# Patient Record
Sex: MALE | Race: BLACK OR AFRICAN AMERICAN | Employment: FULL TIME | URBAN - METROPOLITAN AREA
[De-identification: names, ages, dates, MRNs, and addresses within clinical notes are randomized per-mention and may not be internally consistent; named-entity substitution may affect disease eponyms.]

---

## 2020-10-07 ENCOUNTER — HOSPITAL ENCOUNTER (EMERGENCY)
Age: 25
Discharge: HOME | End: 2020-10-07

## 2020-10-07 ENCOUNTER — APPOINTMENT (OUTPATIENT)
Dept: GENERAL RADIOLOGY | Age: 25
End: 2020-10-07
Attending: EMERGENCY MEDICINE

## 2020-10-07 VITALS
DIASTOLIC BLOOD PRESSURE: 69 MMHG | HEIGHT: 74 IN | RESPIRATION RATE: 18 BRPM | SYSTOLIC BLOOD PRESSURE: 105 MMHG | TEMPERATURE: 98.5 F | BODY MASS INDEX: 23.1 KG/M2 | WEIGHT: 180 LBS | HEART RATE: 72 BPM | OXYGEN SATURATION: 99 %

## 2020-10-07 DIAGNOSIS — S63.501A SPRAIN OF RIGHT WRIST, INITIAL ENCOUNTER: Primary | ICD-10-CM

## 2020-10-07 PROCEDURE — 99283 EMERGENCY DEPT VISIT LOW MDM: CPT

## 2020-10-07 PROCEDURE — 73110 X-RAY EXAM OF WRIST: CPT

## 2020-10-07 RX ORDER — NAPROXEN 500 MG/1
500 TABLET ORAL 2 TIMES DAILY WITH MEALS
Qty: 20 TAB | Refills: 0 | Status: SHIPPED | OUTPATIENT
Start: 2020-10-07 | End: 2020-10-17

## 2020-10-07 NOTE — ED TRIAGE NOTES
Right wrist pain, and numbness. SX. X 1 day. Patient states he cut right forearm on 09/23/2020 received sutures.

## 2020-10-07 NOTE — Clinical Note
[address] [place] [address] EMERGENCY DEPT 
[facility] [address] [address] 
[phone] Work/School Note Date: 10/7/2020 To Whom It May concern: 
 
Tray Winston was seen and treated today in the emergency room by the following provider(s): 
Physician Assistant: SYLWIA Newberry. Tray Winston is excused from work/school on 10/07/20 and 10/08/20. He is medically clear to return to work/school on 10/9/2020. Sincerely, SYLWIA Garcia

## 2020-10-07 NOTE — ED PROVIDER NOTES
EMERGENCY DEPARTMENT HISTORY AND PHYSICAL EXAM      Date: 10/7/2020  Patient Name: Sage Tirado    History of Presenting Illness     Chief Complaint   Patient presents with    Wrist Pain       History Provided By: Patient    HPI: Sage Tirado, 23 y.o. male L hand dominant with  No significant past medical history presents to the ED with cc of gradually worsening, constant, sharp right wrist pain and numbness over recent laceration site x1 day. Patient reports he lacerated his arm after he went through glass on 9/23/2020. [facility] but no x-ray. Had sutures removed recently and noticed today while at work at [employer] carrying heavy boxes that his right wrist hurt and his laceration site feels numb. He has not taken any medications for his symptoms. Smokes cigarettes daily. Denies any other medical history. Denies any new injury, numbness or tingling in his fingers, chest pain, shortness of breath, fever, chills. There are no other complaints, changes, or physical findings at this time. PCP: No primary care provider on file. No current facility-administered medications on file prior to encounter. No current outpatient medications on file prior to encounter. Past History     Past Medical History:  No past medical history on file. Past Surgical History:  No past surgical history on file. Family History:  No family history on file. Social History:  Social History     Tobacco Use    Smoking status: Not on file   Substance Use Topics    Alcohol use: Not on file    Drug use: Not on file       Allergies:  No Known Allergies      Review of Systems     Review of Systems   Constitutional: Negative for chills, fatigue and fever. HENT: Negative. Respiratory: Negative for cough, chest tightness, shortness of breath and wheezing. Cardiovascular: Negative for chest pain and palpitations.    Gastrointestinal: Negative for abdominal pain, diarrhea, nausea and vomiting. Genitourinary: Negative for frequency and urgency. Musculoskeletal: Positive for arthralgias (R wrist). Negative for back pain, neck pain and neck stiffness. Skin: Negative for rash. Neurological: Negative for dizziness, weakness, light-headedness and headaches. Psychiatric/Behavioral: Negative. All other systems reviewed and are negative. Physical Exam     Physical Exam  Vitals signs and nursing note reviewed. Constitutional:       General: He is not in acute distress. Appearance: Normal appearance. He is not ill-appearing or toxic-appearing. HENT:      Head: Normocephalic and atraumatic. Nose: Nose normal.      Mouth/Throat:      Mouth: Mucous membranes are moist.   Eyes:      General: Lids are normal.      Conjunctiva/sclera:      Right eye: Right conjunctiva is not injected. Left eye: Left conjunctiva is not injected. Neck:      Musculoskeletal: Normal range of motion and neck supple. Cardiovascular:      Rate and Rhythm: Normal rate and regular rhythm. Heart sounds: No murmur. No friction rub. No gallop. Pulmonary:      Effort: Pulmonary effort is normal. No tachypnea or respiratory distress. Breath sounds: Normal breath sounds. No stridor or decreased air movement. Musculoskeletal: Normal range of motion. General: No swelling, tenderness, deformity or signs of injury. Right lower leg: No edema. Left lower leg: No edema. Comments: Approximately 3 cm linear vertical laceration that is well-healed to volar aspect of the right wrist.  No active drainage. No erythema to suggest cellulitis. Mild tenderness/sensitivity to palpation over the laceration site. 2+ radial pulse bilaterally.  strength 5/5 bilaterally. Sensation intact distally. Skin:     General: Skin is warm. Capillary Refill: Capillary refill takes less than 2 seconds. Neurological:      General: No focal deficit present. Mental Status: He is alert and oriented to person, place, and time. Mental status is at baseline. Psychiatric:         Mood and Affect: Mood normal.         Behavior: Behavior is cooperative. Thought Content: Thought content normal.         Judgment: Judgment normal.         Diagnostic Study Results     Labs -   No results found for this or any previous visit (from the past 12 hour(s)). Radiologic Studies -   XR Results (most recent):  Results from Hospital Encounter encounter on 10/07/20   XR WRIST RT AP/LAT/OBL MIN 3V    Narrative Right wrist, 3 views, 10/7/2020    History: Injury. Comparison: None. Findings: The carpal bones demonstrate anatomical alignment. No acute fracture  is identified. The distal radius and ulna appear intact. Impression Impression: No acute fracture or dislocation. Medical Decision Making   I am the first provider for this patient. I reviewed the vital signs, available nursing notes, past medical history, past surgical history, family history and social history. Vital Signs-Reviewed the patient's vital signs. Patient Vitals for the past 12 hrs:   Temp Pulse Resp BP SpO2   10/07/20 1113 98.3 °F (36.8 °C) 72 18 114/69 98 %       Records Reviewed: Nursing Notes    The patient presents with L wrist pain with a differential diagnosis of sprain, contusion, cellulitis      Provider Notes (Medical Decision Making):     MDM  Number of Diagnoses or Management Options  Sprain of right wrist, initial encounter:   Diagnosis management comments:     26-year-old with nontraumatic right wrist pain. X-ray was unremarkable. Patient is neurovascularly intact. Will provide him with pain medication and wrist splint for symptomatic relief. Provided work note. Patient agreeable plan of care.        Amount and/or Complexity of Data Reviewed  Tests in the radiology section of CPT®: ordered and reviewed  Review and summarize past medical records: yes    Patient Progress  Patient progress: stable           ED Course:   Initial assessment performed. The patients presenting problems have been discussed, and they are in agreement with the care plan formulated and outlined with them. I have encouraged them to ask questions as they arise throughout their visit. PLAN:  1. Current Discharge Medication List      START taking these medications    Details   naproxen (Naprosyn) 500 mg tablet Take 1 Tab by mouth two (2) times daily (with meals) for 10 days. Qty: 20 Tab, Refills: 0           2. Follow-up Information     Follow up With Specialties Details Why [facility]    [facility] EMERGENCY DEPT Emergency Medicine  As needed, If symptoms worsen [address] Des Michael MD Family Medicine  As needed [address] [place] [id number]  [phone]          Return to ED if worse     Diagnosis     Clinical Impression:   1. Sprain of right wrist, initial encounter      DISCHARGE NOTE:  12:00 PM  Pt has been reexamined. Pt has no new complaints, changes, or physical findings. Care plan outlined and precautions discussed. All available results reviewed with pt. All medications reviewed with pt. All of pts questions and concerns addressed. Pt agrees to f/u as instructed and agrees to return to ED upon further deterioration. Pt is ready to go home. Please note that this dictation was completed with [employer], the computer voice recognition software. Quite often unanticipated grammatical, syntax, homophones, and other interpretive errors are inadvertently transcribed by the computer software. Please disregard these errors. Please excuse any errors that have escaped final proofreading. Thank you.   SYLWIA Lofton

## 2020-10-19 ENCOUNTER — HOSPITAL ENCOUNTER (EMERGENCY)
Age: 25
Discharge: HOME | End: 2020-10-19
Attending: EMERGENCY MEDICINE

## 2020-10-19 VITALS
TEMPERATURE: 97.9 F | WEIGHT: 173 LBS | DIASTOLIC BLOOD PRESSURE: 73 MMHG | RESPIRATION RATE: 18 BRPM | OXYGEN SATURATION: 100 % | BODY MASS INDEX: 22.2 KG/M2 | HEIGHT: 74 IN | SYSTOLIC BLOOD PRESSURE: 115 MMHG | HEART RATE: 55 BPM

## 2020-10-19 DIAGNOSIS — T78.40XA ALLERGIC REACTION, INITIAL ENCOUNTER: ICD-10-CM

## 2020-10-19 DIAGNOSIS — L23.9 ALLERGIC CONTACT DERMATITIS, UNSPECIFIED TRIGGER: Primary | ICD-10-CM

## 2020-10-19 PROCEDURE — 99281 EMR DPT VST MAYX REQ PHY/QHP: CPT

## 2020-10-19 RX ORDER — LORATADINE 10 MG/1
10 TABLET ORAL DAILY
Qty: 30 TAB | Refills: 0 | Status: SHIPPED | OUTPATIENT
Start: 2020-10-19

## 2020-10-19 RX ORDER — PREDNISONE 20 MG/1
60 TABLET ORAL DAILY
Qty: 15 TAB | Refills: 0 | Status: SHIPPED | OUTPATIENT
Start: 2020-10-19 | End: 2020-10-24

## 2020-10-19 RX ORDER — TRAMADOL HYDROCHLORIDE 50 MG/1
50 TABLET ORAL
Qty: 12 TAB | Refills: 0 | Status: SHIPPED | OUTPATIENT
Start: 2020-10-19 | End: 2020-10-22

## 2020-10-19 NOTE — ED TRIAGE NOTES
C/O hives to upper body x 3 days. Unknown cause. Recent PMH:     Pt had laceration in September and was sutured at [facility]. Came to this ED for pain management for lac. Was prescribed naproxen.

## 2020-10-19 NOTE — ED PROVIDER NOTES
EMERGENCY DEPARTMENT HISTORY AND PHYSICAL EXAM      Date: 10/19/2020  Patient Name: Betzaida Pichardo    History of Presenting Illness     Chief Complaint   Patient presents with    Rash       History Provided By: Patient    HPI: Betzaida Pichardo, 23 y.o. male with a past medical history significant No significant past medical history presents to the ED with chief complaint of Rash  . Patient was recently seen for a wrist injury had the sutures removed but did have some residual pain and was prescribed Naprosyn. This was about 3 weeks ago. Started the Naprosyn 1 week ago which tolerated the pain well. Has had the medicine before with no issues. He has been having a diffuse urticarial hive-like rash for the last few days. He sleeps in the same bed as his girlfriend who has no symptoms. He has not tried any topical creams. No antihistamines. It is all over sparing his face. No new detergents or soaps. No new foods. Also denies any cough shortness of breath or fevers. There are no other complaints, changes, or physical findings at this time. PCP: None    Current Outpatient Medications   Medication Sig Dispense Refill    predniSONE (DELTASONE) 20 mg tablet Take 60 mg by mouth daily for 5 days. With Breakfast 15 Tab 0    traMADoL (Ultram) 50 mg tablet Take 1 Tab by mouth every six (6) hours as needed for Pain for up to 3 days. Max Daily Amount: 200 mg. 12 Tab 0    loratadine (Claritin) 10 mg tablet Take 1 Tab by mouth daily. 30 Tab 0       Past History     Past Medical History:  No past medical history on file. Past Surgical History:  No past surgical history on file. Family History:  No family history on file. Social History:  Social History     Tobacco Use    Smoking status: Not on file   Substance Use Topics    Alcohol use: Not on file    Drug use: Not on file       Allergies:  No Known Allergies      Review of Systems   Review of Systems   Constitutional: Negative.   Negative for chills, fatigue and fever. HENT: Negative. Negative for congestion, ear pain, nosebleeds and sore throat. Eyes: Negative. Negative for pain, discharge and visual disturbance. Respiratory: Negative. Negative for cough, chest tightness and shortness of breath. Cardiovascular: Negative. Negative for chest pain and leg swelling. Gastrointestinal: Negative. Negative for abdominal pain, blood in stool, constipation, diarrhea, nausea and vomiting. Endocrine: Negative. Genitourinary: Negative. Negative for difficulty urinating, dysuria and flank pain. Musculoskeletal: Negative. Negative for back pain and myalgias. Skin: Positive for rash. Negative for wound. Allergic/Immunologic: Negative. Neurological: Negative. Negative for dizziness, syncope, weakness, numbness and headaches. Hematological: Negative. Does not bruise/bleed easily. Psychiatric/Behavioral: Negative. Negative for agitation, confusion, hallucinations and suicidal ideas. All other systems reviewed and are negative. Physical Exam   Physical Exam  Vitals signs and nursing note reviewed. Constitutional:       General: He is not in acute distress. Appearance: He is normal weight. He is not ill-appearing. HENT:      Head: Normocephalic and atraumatic. Right Ear: External ear normal.      Left Ear: External ear normal.      Nose: Nose normal. No rhinorrhea. Mouth/Throat:      Mouth: Mucous membranes are moist.      Pharynx: Oropharynx is clear. Eyes:      Extraocular Movements: Extraocular movements intact. Conjunctiva/sclera: Conjunctivae normal.      Pupils: Pupils are equal, round, and reactive to light. Neck:      Musculoskeletal: Normal range of motion and neck supple. Cardiovascular:      Rate and Rhythm: Normal rate and regular rhythm. Pulses: Normal pulses. Heart sounds: Normal heart sounds. Pulmonary:      Effort: Pulmonary effort is normal. No respiratory distress. Breath sounds: Normal breath sounds. Abdominal:      General: Abdomen is flat. Bowel sounds are normal.      Palpations: Abdomen is soft. Musculoskeletal: Normal range of motion. General: No tenderness or deformity. Skin:     General: Skin is warm and dry. Capillary Refill: Capillary refill takes less than 2 seconds. Findings: No bruising, lesion or rash. Comments: Diffuse urticarial hive-like rash. Neurological:      General: No focal deficit present. Mental Status: He is alert and oriented to person, place, and time. Mental status is at baseline. Psychiatric:         Mood and Affect: Mood normal.         Behavior: Behavior normal.         Thought Content: Thought content normal.         Judgment: Judgment normal.         Diagnostic Study Results     Labs -   No results found for this or any previous visit (from the past 12 hour(s)). Radiologic Studies -   No orders to display     CT Results  (Last 48 hours)    None        CXR Results  (Last 48 hours)    None          Medical Decision Making and ED Course   I am the first provider for this patient. I reviewed the vital signs, available nursing notes, past medical history, past surgical history, family history and social history. Vital Signs-Reviewed the patient's vital signs. Patient Vitals for the past 12 hrs:   Temp Pulse Resp BP SpO2   10/19/20 1042 97.9 °F (36.6 °C) (!) 55 18 115/73 100 %       EKG interpretation:         Records Reviewed: Previous Hospital chart. EMS run report      ED Course:   Initial assessment performed. The patients presenting problems have been discussed, and they are in agreement with the care plan formulated and outlined with them. I have encouraged them to ask questions as they arise throughout their visit. Orders Placed This Encounter    predniSONE (DELTASONE) 20 mg tablet     Sig: Take 60 mg by mouth daily for 5 days.  With Breakfast     Dispense:  15 Tab     Refill:  0    traMADoL (Ultram) 50 mg tablet     Sig: Take 1 Tab by mouth every six (6) hours as needed for Pain for up to 3 days. Max Daily Amount: 200 mg. Dispense:  12 Tab     Refill:  0    loratadine (Claritin) 10 mg tablet     Sig: Take 1 Tab by mouth daily. Dispense:  30 Tab     Refill:  0              CONSULTANTS:      Provider Notes (Medical Decision Making):   37-year-old male with stable vitals presents with urticarial hive-like rash diffuse. Concern for an allergic reaction. No evidence of petechiae, vesicles, bedbugs. Questionable which may have triggered it could be the Naprosyn he recently started for his wrist pain versus a food. Plan to switch to a different pain reliever. Treatment for allergic reaction with steroids and antihistamines at home. Vitals are stable for discharge. Procedures                       Disposition       Emergency Department Disposition:  Discharged      DISCHARGE PLAN:    Patient is discharged home. Discharge instructions provided. Patient is stable and improved at time of disposition. Vitals are stable. 1.   Current Discharge Medication List      START taking these medications    Details   predniSONE (DELTASONE) 20 mg tablet Take 60 mg by mouth daily for 5 days. With Breakfast  Qty: 15 Tab, Refills: 0      traMADoL (Ultram) 50 mg tablet Take 1 Tab by mouth every six (6) hours as needed for Pain for up to 3 days. Max Daily Amount: 200 mg. Qty: 12 Tab, Refills: 0    Associated Diagnoses: Allergic contact dermatitis, unspecified trigger; Allergic reaction, initial encounter      loratadine (Claritin) 10 mg tablet Take 1 Tab by mouth daily. Qty: 30 Tab, Refills: 0           2. Follow-up Information     Follow up With Specialties Details Why Contact Info    Guadlupe Hodgkin, MD Internal Medicine   [address] [place] [address]  [address] [place], [place] [address]y Dermatology   As needed [address] [facility],[address]  [phone]        3. Return to ED if worse     Pt voiced they understand they plan and do not have questions at this time      Diagnosis     Clinical Impression:   1. Allergic contact dermatitis, unspecified trigger    2. Allergic reaction, initial encounter        Attestations:    Nadine Casillas MD    Please note that this dictation was completed with [employer], the computer voice recognition software. Quite often unanticipated grammatical, syntax, homophones, and other interpretive errors are inadvertently transcribed by the computer software. Please disregard these errors. Please excuse any errors that have escaped final proofreading. Thank you.

## 2020-10-19 NOTE — DISCHARGE INSTRUCTIONS
Patient Education        Dermatitis: Care Instructions  Your Care Instructions  Dermatitis is the general name used for any rash or inflammation of the skin. Different kinds of dermatitis cause different kinds of rashes. Common causes of a rash include new medicines, plants (such as poison oak or poison ivy), heat, and stress. Certain illnesses can also cause a rash. An allergic reaction to something that touches your skin, such as latex, nickel, or poison ivy, is called contact dermatitis. Contact dermatitis may also be caused by something that irritates the skin, such as bleach, a chemical, or soap. These types of rashes cannot be spread from person to person. How long your rash will last depends on what caused it. Rashes may last a few days or months. Follow-up care is a key part of your treatment and safety. Be sure to make and go to all appointments, and call your doctor if you are having problems. It's also a good idea to know your test results and keep a list of the medicines you take. How can you care for yourself at home? · Do not scratch the rash. Cut your nails short, and file them smooth. Or wear gloves if this helps keep you from scratching. · Wash the area with water only. Pat dry. · Put cold, wet cloths on the rash to reduce itching. · Keep cool, and stay out of the sun. · Leave the rash open to the air as much as possible. · If the rash itches, use hydrocortisone cream. Follow the directions on the label. Calamine lotion may help for plant rashes. · Take an over-the-counter antihistamine, such as diphenhydramine (Benadryl) or loratadine (Claritin), to help calm the itching. Read and follow all instructions on the label. · If your doctor prescribed a cream, use it as directed. If your doctor prescribed medicine, take it exactly as directed. When should you call for help?    Call your doctor now or seek immediate medical care if:    · You have symptoms of infection, such as:  ? Increased pain, swelling, warmth, or redness. ? Red streaks leading from the area. ? Pus draining from the area. ? A fever.     · You have joint pain along with the rash. Watch closely for changes in your health, and be sure to contact your doctor if:    · Your rash is changing or getting worse.     · You are not getting better as expected. Where can you learn more? Go to http://www.gray.com/  Enter F270 in the search box to learn more about \"Dermatitis: Care Instructions. \"  Current as of: July 2, 2020               Content Version: 12.6  © 4884-8520 [employer]. Care instructions adapted under license by [employer] (which disclaims liability or warranty for this information). If you have questions about a medical condition or this instruction, always ask your healthcare professional. [place] [address] any warranty or liability for your use of this information. Patient Education        Allergic Reaction: Care Instructions  Your Care Instructions     An allergic reaction is an excessive response from your immune system to a medicine, chemical, food, insect bite, or other substance. A reaction can range from mild to life-threatening. Some people have a mild rash, hives, and itching or stomach cramps. In severe reactions, swelling of your tongue and throat can close up your airway so that you cannot breathe. Follow-up care is a key part of your treatment and safety. Be sure to make and go to all appointments, and call your doctor if you are having problems. It's also a good idea to know your test results and keep a list of the medicines you take. How can you care for yourself at home? · If you know what caused your allergic reaction, be sure to avoid it. Your allergy may become more severe each time you have a reaction. · Take an over-the-counter antihistamine, such as cetirizine (Zyrtec) or loratadine (Claritin), to treat mild symptoms. Read and follow directions on the label. Some antihistamines can make you feel sleepy. Do not give antihistamines to a child unless you have checked with your doctor first. Mild symptoms include sneezing or an itchy or runny nose; an itchy mouth; a few hives or mild itching; and mild nausea or stomach discomfort. · Do not scratch hives or a rash. Put a cold, moist towel on them or take cool baths to relieve itching. Put ice packs on hives, swelling, or insect stings for 10 to 15 minutes at a time. Put a thin cloth between the ice pack and your skin. Do not take hot baths or showers. They will make the itching worse. · Your doctor may prescribe a shot of epinephrine to carry with you in case you have a severe reaction. Learn how to give yourself the shot and keep it with you at all times. Make sure it is not . · Go to the emergency room every time you have a severe reaction, even if you have used your shot of epinephrine and are feeling better. Symptoms can come back after a shot. · Wear medical alert jewelry that lists your allergies. You can buy this at most [address].[employer]. · If your child has a severe allergy, make sure that his or her teachers, babysitters, coaches, and other caregivers know about the allergy. They should have an epinephrine shot, know how and when to give it, and have a plan to take your child to the hospital.  When should you call for help? Give an epinephrine shot if:    · You think you are having a severe allergic reaction.     · You have symptoms in more than one body area, such as mild nausea and an itchy mouth. After giving an epinephrine shot call 911, even if you feel better. Call 911 if:    · You have symptoms of a severe allergic reaction. These may include:  ? Sudden raised, red areas (hives) all over your body. ? Swelling of the throat, mouth, lips, or tongue. ? Trouble breathing. ? Passing out (losing consciousness).  Or you may feel very lightheaded or suddenly feel weak, confused, or restless.     · You have been given an epinephrine shot, even if you feel better. Call your doctor now or seek immediate medical care if:    · You have symptoms of an allergic reaction, such as:  ? A rash or hives (raised, red areas on the skin). ? Itching. ? Swelling. ? Belly pain, nausea, or vomiting. Watch closely for changes in your health, and be sure to contact your doctor if:    · You do not get better as expected. Where can you learn more? Go to http://www.gray.com/  Enter M336 in the search box to learn more about \"Allergic Reaction: Care Instructions. \"  Current as of: June 29, 2020               Content Version: 12.6  © 2006-2020 [employer]. Care instructions adapted under license by [employer] (which disclaims liability or warranty for this information). If you have questions about a medical condition or this instruction, always ask your healthcare professional. [place] [address] any warranty or liability for your use of this information.

## 2020-10-19 NOTE — LETTER
[address] [place] [address] EMERGENCY DEPT 
[facility] [address] [address] 
[phone] Work/School Note Date: 10/19/2020 To Whom It May concern: 
 
Edgardo Cabrales was seen and treated today in the emergency room by the following provider(s): 
Attending Provider: Dorian De Dios MD. Edgardo Cabrales may return to work on 10/21/2020.  
 
Sincerely, 
 
 
 
 
Speedy Reyes RN

## 2020-10-19 NOTE — ED NOTES
[facility] 15 all medications, side effects, and follow up appointments explained with pt's verbal understanding given.  All personal belongings taken

## 2023-05-14 RX ORDER — LORATADINE 10 MG/1
10 TABLET ORAL DAILY
COMMUNITY
Start: 2020-10-19